# Patient Record
Sex: MALE | Race: WHITE | NOT HISPANIC OR LATINO | Employment: OTHER | URBAN - METROPOLITAN AREA
[De-identification: names, ages, dates, MRNs, and addresses within clinical notes are randomized per-mention and may not be internally consistent; named-entity substitution may affect disease eponyms.]

---

## 2018-02-21 ENCOUNTER — TELEPHONE (OUTPATIENT)
Dept: FAMILY MEDICINE | Facility: CLINIC | Age: 48
End: 2018-02-21

## 2018-02-21 ENCOUNTER — OFFICE VISIT (OUTPATIENT)
Dept: FAMILY MEDICINE | Facility: CLINIC | Age: 48
End: 2018-02-21
Payer: COMMERCIAL

## 2018-02-21 VITALS
SYSTOLIC BLOOD PRESSURE: 134 MMHG | OXYGEN SATURATION: 96 % | TEMPERATURE: 98 F | RESPIRATION RATE: 16 BRPM | HEART RATE: 98 BPM | WEIGHT: 236 LBS | BODY MASS INDEX: 31.97 KG/M2 | DIASTOLIC BLOOD PRESSURE: 87 MMHG | HEIGHT: 72 IN

## 2018-02-21 DIAGNOSIS — K64.8 INTERNAL HEMORRHOIDS: ICD-10-CM

## 2018-02-21 DIAGNOSIS — K62.89 PROCTALGIA: Primary | ICD-10-CM

## 2018-02-21 DIAGNOSIS — K62.89 PROCTALGIA: ICD-10-CM

## 2018-02-21 DIAGNOSIS — K64.8 INTERNAL HEMORRHOIDS: Primary | ICD-10-CM

## 2018-02-21 PROCEDURE — 46600 DIAGNOSTIC ANOSCOPY SPX: CPT | Performed by: FAMILY MEDICINE

## 2018-02-21 PROCEDURE — 99202 OFFICE O/P NEW SF 15 MIN: CPT | Mod: 25 | Performed by: FAMILY MEDICINE

## 2018-02-21 NOTE — MR AVS SNAPSHOT
After Visit Summary   2/21/2018    Bronson Lopes    MRN: [id number]           Patient Information     Date Of Birth          1970        Visit Information        Provider Department      2/21/2018 3:20 PM Desean Anderson MD [facility] [place]        Today's Diagnoses     Proctalgia    -  1    Internal hemorrhoids           Follow-ups after your visit        Additional Services     COLORECTAL SURGERY REFERRAL       Your provider has referred you to: [place]: [facility] Eliecer Capellan [phone]  [email or web address]    Referral Reason(s): Hemorrhoids and proctalgia   Special Concerns: None  This referral is: Elective (week +)  It is OK to leave a message on patient's voicemail.    Please be aware that coverage of these services is subject to the terms and limitations of your health insurance plan.  Call member services at your health plan with any benefit or coverage questions.      Please bring the following with you to your appointment:    (1) Any X-Rays, CTs or MRIs which have been performed.  Contact the facility where they were done to arrange for  prior to your scheduled appointment.    (2) List of current medications  (3) This referral request   (4) Any documents/labs given to you for this referral                  Follow-up notes from your care team     Return if symptoms worsen or fail to improve.      Who to contact     If you have questions or need follow up information about today's clinic visit or your schedule please contact [facility] [place] directly at [phone].  Normal or non-critical lab and imaging results will be communicated to you by MyChart, letter or phone within 4 business days after the clinic has received the results. If you do not hear from us within 7 days, please contact the clinic through MyChart or phone. If you have a critical or abnormal lab result, we will notify you by phone as  "soon as possible.  Submit refill requests through [employer] or call your pharmacy and they will forward the refill request to us. Please allow 3 business days for your refill to be completed.          Additional Information About Your Visit        [employer] Information     [employer] lets you send messages to your doctor, view your test results, renew your prescriptions, schedule appointments and more. To sign up, go to [email or web address] . Click on \"Log in\" on the left side of the screen, which will take you to the Welcome page. Then click on \"Sign up Now\" on the right side of the page.     You will be asked to enter the access code listed below, as well as some personal information. Please follow the directions to create your username and password.     Your access code is: [email or web address]  Expires: 2018  3:51 PM     Your access code will  in 90 days. If you need help or a new code, please call your [place] clinic or [phone].        Care EveryWhere ID     This is your Care EveryWhere ID. This could be used by other organizations to access your [place] medical records  [id number]        Your Vitals Were     Pulse Temperature Respirations Height Pulse Oximetry BMI (Body Mass Index)    98 98  F (36.7  C) (Tympanic) 16 6' (1.829 m) 96% 32.01 kg/m2       Blood Pressure from Last 3 Encounters:   18 134/87    Weight from Last 3 Encounters:   18 236 lb (107 kg)              We Performed the Following     COLORECTAL SURGERY REFERRAL        Primary Care Provider Office Phone # Fax #    Desean Anderson [phone]       [address] [facility] [address]        Equal Access to Services     [facility] : Hadii gee jurado Somalik, waaxda luqadaha, qaybta kaalmada rodrick, cruz nieto. So [facility] [phone].    ATENCIÓN: Si habla español, tiene a casey disposición servicios gratuitos de asistencia lingüística. Llame al [phone].    We " comply with applicable federal civil rights laws and [place] laws. We do not discriminate on the basis of race, color, national origin, age, disability, sex, sexual orientation, or gender identity.            Thank you!     Thank you for choosing [facility] [place]  for your care. Our goal is always to provide you with excellent care. Hearing back from our patients is one way we can continue to improve our services. Please take a few minutes to complete the written survey that you may receive in the mail after your visit with us. Thank you!             Your Updated Medication List - Protect others around you: Learn how to safely use, store and throw away your medicines at www.disposemymeds.org.      Notice  As of 2/21/2018  3:51 PM    You have not been prescribed any medications.

## 2018-02-21 NOTE — NURSING NOTE
Chief Complaint   Patient presents with     Rectal Problem       Initial BP (!) 144/91 (Cuff Size: Adult Large)  Pulse 98  Temp 98  F (36.7  C) (Tympanic)  Resp 16  Ht 6' (1.829 m)  Wt 236 lb (107 kg)  SpO2 96%  BMI 32.01 kg/m2 Estimated body mass index is 32.01 kg/(m^2) as calculated from the following:    Height as of this encounter: 6' (1.829 m).    Weight as of this encounter: 236 lb (107 kg).  Medication Reconciliation: complete   Rebeca Wagner, CMA

## 2018-02-21 NOTE — TELEPHONE ENCOUNTER
Patient was seen today and given a referral to   [facility] [place] [phone]   He called them and they said that they no longer have a Provider who deals with hemorrhoids  Do you want to enter another referral for the patient ?  TC to call patient with new referral information [phone]    Ct SUE

## 2018-02-21 NOTE — PROGRESS NOTES
SUBJECTIVE:   Bronson Lopes is a 47 year old male who presents to clinic today for the following health issues:      Hemorrhoids       Duration: 2 months     Description:   Pain: no   Itching: YES    Accompanying signs and symptoms:   Blood in stool: no   Changes in stool pattern: YES    History (similar episodes/previous evaluation): history of hemorrhoids since 1980    Precipitating or alleviating factors: None    Therapies tried and outcome: preparation H    Problem list and histories reviewed & adjusted, as indicated.  Additional history: as documented    There is no problem list on file for this patient.    No past surgical history on file.    Social History   Substance Use Topics     Smoking status: Former Smoker     Quit date: 1/1/2011     Smokeless tobacco: Former User     Quit date: 1/1/1990     Alcohol use 15.0 oz/week     25 Standard drinks or equivalent per week     Family History   Problem Relation Age of Onset     Celiac Disease Sister          No current outpatient prescriptions on file.     Allergies   Allergen Reactions     Aspirin Rash     No lab results found.   BP Readings from Last 3 Encounters:   02/21/18 134/87    Wt Readings from Last 3 Encounters:   02/21/18 236 lb (107 kg)               Reviewed and updated as needed this visit by clinical staff       Reviewed and updated as needed this visit by Provider             ROS:  Constitutional, HEENT, cardiovascular, pulmonary, gi and gu systems are negative, except as otherwise noted.    OBJECTIVE:     /87 (Cuff Size: Adult Large)  Pulse 98  Temp 98  F (36.7  C) (Tympanic)  Resp 16  Ht 6' (1.829 m)  Wt 236 lb (107 kg)  SpO2 96%  BMI 32.01 kg/m2  Body mass index is 32.01 kg/(m^2).  GENERAL: healthy, alert and no distress  NECK: no adenopathy, no asymmetry, masses, or scars and thyroid normal to palpation  RESP: lungs clear to auscultation - no rales, rhonchi or wheezes  CV: regular rate and rhythm, normal S1 S2, no S3 or S4, no  murmur, click or rub, no peripheral edema and peripheral pulses strong  ABDOMEN: soft, nontender, no hepatosplenomegaly, no masses and bowel sounds normal  RECTAL (male): normal sphincter tone, no rectal masses and swollen rectal mucous membrane without sign of external hemorrhoid, has skin tag on 1 o'clock direction(1cm diameter)  MS: no gross musculoskeletal defects noted, no edema        ASSESSMENT/PLAN:   Bronson was seen today for rectal problem.    Diagnoses and all orders for this visit:    Proctalgia  -     COLORECTAL SURGERY REFERRAL    Internal hemorrhoids  -     COLORECTAL SURGERY REFERRAL      Pt has been using preparation H multiple time per day for several week, sx hasn't been improving, Anoscopic exam did not show abnormal mass except swelling erythematous rectal mucous membrane   Will have him to see colorectal surgery for further evaluation       Desean Anderson MD  [facility]

## 2018-02-21 NOTE — TELEPHONE ENCOUNTER
New referral order placed as below, please let him know  thx    - [facility] [place] [phone]   [email or web address]

## 2018-03-12 ENCOUNTER — TRANSFERRED RECORDS (OUTPATIENT)
Dept: HEALTH INFORMATION MANAGEMENT | Facility: CLINIC | Age: 48
End: 2018-03-12

## 2020-05-23 ENCOUNTER — NURSE TRIAGE (OUTPATIENT)
Dept: NURSING | Facility: CLINIC | Age: 50
End: 2020-05-23

## 2020-05-23 ENCOUNTER — TELEPHONE (OUTPATIENT)
Dept: SCHEDULING | Facility: CLINIC | Age: 50
End: 2020-05-23

## 2020-05-23 NOTE — TELEPHONE ENCOUNTER
Pt was transferred from [facility] Triage to get a appt. Set up for a telephone visit with a Urgent care provider. Pt declined appt.

## 2020-05-23 NOTE — TELEPHONE ENCOUNTER
Stepped on a dirty, santhosh nail on the left foot.   Went in about 1/4-1/2 inch.   Not much bleeding.   Pain currently 1/10   Cleaned it right away with peroxide, has not cleaned with soap and water. States he will have his mom, a former paramedic, clean the area again with soapy water.     Last tetanus was 1/22/2018    Per protocol RN recommended appointment with urgent care tonight, either phone or in person.  Pt prefers phone visit.  RN transferred Bronson to schedule.     Pt to call back with any new or worsening symptoms and will scrub area with soapy water for about 15 minutes.     Ynes Samson RN   [facility] RN Triage     Additional Information    Negative: Sounds like a life-threatening emergency to the triager    Puncture wound (and no current foreign body)    Negative: [1] Puncture wound of head, neck, chest, back, or abdomen AND [2] sounds life-threatening to the triager    Negative: Shock suspected (e.g., cold/pale/clammy skin, too weak to stand, low BP, rapid pulse)    Negative: Sounds like a life-threatening emergency to the triager    Negative: [1] Caused by a needlestick or other sharp object AND [2] possible exposure to another person's body fluids    Negative: Caused by an animal bite    Negative: Caused by a human bite    Negative: Skin is cut or scraped, not punctured    Negative: Puncture wound of eye or eyelid    Negative: Foreign body is still in the skin (e.g., splinter, sliver, fishhook)    Negative: [1] Puncture wound of head, neck, chest, abdomen, or overlying a joint AND [2] could be deep    Negative: High pressure injection injury (e.g., from grease gun or paint gun, usually work-related)    Negative: Sounds like a serious injury to the triager    Negative: [1] SEVERE pain AND [2] not improved 2 hours after pain medicine    Negative: [1] Puncture wound of foot AND [2] hurts too much to walk on it (i.e., unable to bear weight, severe limp)    Negative: [1] Puncture wound of bare  foot (no shoes) AND [2] setting was dirty  (Exception: shallow puncture)    Negative: [1] Puncture wound of finger AND [2] entire finger swollen    Puncture wound from a sharp object that was very dirty    Protocols used: SKIN FOREIGN BODY-A-AH, PUNCTURE WOUND-A-AH

## 2020-09-15 ENCOUNTER — VIRTUAL VISIT (OUTPATIENT)
Dept: FAMILY MEDICINE | Facility: CLINIC | Age: 50
End: 2020-09-15
Payer: COMMERCIAL

## 2020-09-15 DIAGNOSIS — Z83.79 FAMILY HISTORY OF CELIAC DISEASE: Primary | ICD-10-CM

## 2020-09-15 PROCEDURE — 99213 OFFICE O/P EST LOW 20 MIN: CPT | Mod: 95 | Performed by: FAMILY MEDICINE

## 2020-09-15 NOTE — PROGRESS NOTES
"Bronson Lopes is a 50 year old male who is being evaluated via a billable video visit.      The patient has been notified of following:     \"This video visit will be conducted via a call between you and your physician/provider. We have found that certain health care needs can be provided without the need for an in-person physical exam.  This service lets us provide the care you need with a video conversation.  If a prescription is necessary we can send it directly to your pharmacy.  If lab work is needed we can place an order for that and you can then stop by our lab to have the test done at a later time.    Video visits are billed at different rates depending on your insurance coverage.  Please reach out to your insurance provider with any questions.    If during the course of the call the physician/provider feels a video visit is not appropriate, you will not be charged for this service.\"    Patient has given verbal consent for Video visit? Yes  How would you like to obtain your AVS? MyChart  If you are dropped from the video visit, the video invite should be resent to: Text to cell phone: [phone]  Will anyone else be joining your video visit? No      Subjective     Bronson Lopes is a 50 year old male who presents today via video visit for the following health issues:    HPI    Celiac testing       Video Start Time: 12:25        Review of Systems   Constitutional, HEENT, cardiovascular, pulmonary, gi and gu systems are negative, except as otherwise noted.      Objective           Vitals:  No vitals were obtained today due to virtual visit.    Physical Exam     GENERAL: Healthy, alert and no distress  EYES: Eyes grossly normal to inspection.  No discharge or erythema, or obvious scleral/conjunctival abnormalities.  RESP: No audible wheeze, cough, or visible cyanosis.  No visible retractions or increased work of breathing.    SKIN: Visible skin clear. No significant rash, abnormal pigmentation or " lesions.  NEURO: Cranial nerves grossly intact.  Mentation and speech appropriate for age.  PSYCH: Mentation appears normal, affect normal/bright, judgement and insight intact, normal speech and appearance well-groomed.              Assessment & Plan     Family history of celiac disease  Has worsening gluten allergy with GI sx, has family hx of celiac disease  Tried gluten free diet and resume taking carb and sx worsening, will have him to stepwise exposure to gluten again for next 2 weeks with monitoring  If not improving, will have him to do lab test(ordered already as future), and consider referral to GI if indicated   - Endomysial Antibody IgA by IFA; Future  - Tissue transglutaminase antibody IgA; Future  - Deamidated Giladin Peptide Cherie IgA IgG; Future       FUTURE APPOINTMENTS:       - Follow-up visit in 3-4 months for CPE and colonoscopy     No follow-ups on file.    Desean Anderson MD  [facility] [place]      Video-Visit Details    Type of service:  Video Visit    Video End Time:12:52 PM    Originating Location (pt. Location): Home    Distant Location (provider location):  [facility] [place] [facility] [place]     Platform used for Video Visit: Chayo

## 2021-01-15 ENCOUNTER — HEALTH MAINTENANCE LETTER (OUTPATIENT)
Age: 51
End: 2021-01-15

## 2021-08-31 ENCOUNTER — PATIENT OUTREACH (OUTPATIENT)
Dept: FAMILY MEDICINE | Facility: CLINIC | Age: 51
End: 2021-08-31

## 2021-08-31 NOTE — TELEPHONE ENCOUNTER
Patient Quality Outreach      Summary:    Patient has the following on his problem list/HM: None    Patient is due/failing the following:   Colonoscopy    Type of outreach:    Sent letter.    Questions for provider review:    None                                                                                                                                     Princess Kumar MA      Chart routed to Care Team.

## 2021-09-04 ENCOUNTER — HEALTH MAINTENANCE LETTER (OUTPATIENT)
Age: 51
End: 2021-09-04

## 2022-02-19 ENCOUNTER — HEALTH MAINTENANCE LETTER (OUTPATIENT)
Age: 52
End: 2022-02-19

## 2022-03-10 ENCOUNTER — OFFICE VISIT (OUTPATIENT)
Dept: FAMILY MEDICINE | Facility: CLINIC | Age: 52
End: 2022-03-10
Payer: COMMERCIAL

## 2022-03-10 VITALS
HEIGHT: 72 IN | RESPIRATION RATE: 20 BRPM | DIASTOLIC BLOOD PRESSURE: 80 MMHG | SYSTOLIC BLOOD PRESSURE: 133 MMHG | BODY MASS INDEX: 32.01 KG/M2 | TEMPERATURE: 97.9 F | OXYGEN SATURATION: 96 % | HEART RATE: 82 BPM

## 2022-03-10 DIAGNOSIS — F10.20 UNCOMPLICATED ALCOHOL DEPENDENCE (H): ICD-10-CM

## 2022-03-10 DIAGNOSIS — J01.00 ACUTE NON-RECURRENT MAXILLARY SINUSITIS: Primary | ICD-10-CM

## 2022-03-10 PROCEDURE — 99213 OFFICE O/P EST LOW 20 MIN: CPT | Performed by: INTERNAL MEDICINE

## 2022-03-10 ASSESSMENT — PAIN SCALES - GENERAL: PAINLEVEL: NO PAIN (0)

## 2022-03-10 ASSESSMENT — ENCOUNTER SYMPTOMS: COUGH: 1

## 2022-03-10 NOTE — PATIENT INSTRUCTIONS
Try chyna med sinus rinse and fluticasone and mucinex  If no improvement in 2-3 days, start antibiotics

## 2022-03-10 NOTE — PROGRESS NOTES
"  Assessment & Plan     Acute non-recurrent maxillary sinusitis  Lungs are clear.  He does not quite meet criteria for bacterial sinusitis at this time, on the border based on symptom severity.  Recommended he do NeilMed sinus rinse and steroid nasal spray along with Mucinex for 2 to 3 days, if worsening or not noticing any improvement fill antibiotics.  Otherwise continue symptomatic care.  - amoxicillin-clavulanate (AUGMENTIN) 875-125 MG tablet; Take 1 tablet by mouth 2 times daily for 7 days    Uncomplicated alcohol dependence (H)  Plans to cut down significantly.                   Return in about 3 months (around 6/10/2022) for Physical Exam.    [place] Richter MD  [facility]    Yandel Ng is a 51 year old who presents for the following health issues     Hernandez is here with cough and congestion.  Symptoms started about 6 days ago with congestion.  It is moved into his head with significant nasal congestion and pressure.  Feels like \"my hair hurts.\"  It gets worse at night, he is taking NyQuil to sleep but still not sleeping restfully.  In the morning he is blowing a lot of mucus out of his nose.  The last couple days the congestion seems to have moved into his chest.  He has a deeper cough and feels wheezy at times.  He has not felt short of breath.  Has felt alternating chills and sweats, but no fevers.  His energy is a little decreased, his appetite is decreased.  He did take a Covid test at home which was negative.    He does note he typically drinks 2-3 drinks daily, but since these symptoms started hasn't really had anything to drink and plans to try and continue that trend.       He works in real estate.       Review of Systems   Respiratory: Positive for cough.    const, heent, cv, pulm reviewed,  otherwise negative unless noted above.           Objective    /80   Pulse 82   Temp 97.9  F (36.6  C) (Tympanic)   Resp 20   Ht 1.829 m (6')   SpO2 96%   BMI " 32.01 kg/m    Body mass index is 32.01 kg/m .  Physical Exam   Gen: well appearing, pleasant man, no distress  HEENT: PERRL, no conjunctival injection, no posterior pharynx erythema, MMM.  TM normal b/l.  No sinus tenderness. Nasal mucosa red and swollen.   Neck: supple, no LAD  Pulm: breathing comfortably, CTAB, no wheezes or rales  CV: RRR, normal S1 and S2, no murmurs  Ext: 2+ radial pulses

## 2022-10-22 ENCOUNTER — HEALTH MAINTENANCE LETTER (OUTPATIENT)
Age: 52
End: 2022-10-22

## 2023-04-01 ENCOUNTER — HEALTH MAINTENANCE LETTER (OUTPATIENT)
Age: 53
End: 2023-04-01

## 2024-06-02 ENCOUNTER — HEALTH MAINTENANCE LETTER (OUTPATIENT)
Age: 54
End: 2024-06-02